# Patient Record
Sex: FEMALE | Race: WHITE | NOT HISPANIC OR LATINO | URBAN - METROPOLITAN AREA
[De-identification: names, ages, dates, MRNs, and addresses within clinical notes are randomized per-mention and may not be internally consistent; named-entity substitution may affect disease eponyms.]

---

## 2024-05-24 ENCOUNTER — OFFICE VISIT (OUTPATIENT)
Dept: GASTROENTEROLOGY | Facility: CLINIC | Age: 49
End: 2024-05-24
Payer: COMMERCIAL

## 2024-05-24 VITALS — WEIGHT: 224 LBS | OXYGEN SATURATION: 97 % | HEART RATE: 74 BPM | BODY MASS INDEX: 37.32 KG/M2 | HEIGHT: 65 IN

## 2024-05-24 DIAGNOSIS — K21.9 GASTROESOPHAGEAL REFLUX DISEASE WITHOUT ESOPHAGITIS: ICD-10-CM

## 2024-05-24 DIAGNOSIS — K51.30 ULCERATIVE RECTOSIGMOIDITIS WITHOUT COMPLICATION (MULTI): Primary | ICD-10-CM

## 2024-05-24 PROCEDURE — 99204 OFFICE O/P NEW MOD 45 MIN: CPT | Performed by: INTERNAL MEDICINE

## 2024-05-24 PROCEDURE — 1036F TOBACCO NON-USER: CPT | Performed by: INTERNAL MEDICINE

## 2024-05-24 RX ORDER — ONDANSETRON 8 MG/1
8 TABLET, ORALLY DISINTEGRATING ORAL 2 TIMES DAILY PRN
COMMUNITY
Start: 2023-11-25

## 2024-05-24 RX ORDER — TRAZODONE HYDROCHLORIDE 100 MG/1
1 TABLET ORAL NIGHTLY
COMMUNITY
Start: 2023-08-09

## 2024-05-24 RX ORDER — LANCETS
EACH MISCELLANEOUS
COMMUNITY
Start: 2024-02-02

## 2024-05-24 RX ORDER — NORETHINDRONE 0.35 MG/1
1 TABLET ORAL DAILY
COMMUNITY

## 2024-05-24 RX ORDER — GABAPENTIN 300 MG/1
CAPSULE ORAL
COMMUNITY

## 2024-05-24 RX ORDER — ALBUTEROL SULFATE 90 UG/1
2 AEROSOL, METERED RESPIRATORY (INHALATION) 4 TIMES DAILY PRN
COMMUNITY
Start: 2024-05-17

## 2024-05-24 RX ORDER — VENLAFAXINE HYDROCHLORIDE 150 MG/1
150 CAPSULE, EXTENDED RELEASE ORAL DAILY
COMMUNITY

## 2024-05-24 RX ORDER — OMEPRAZOLE 40 MG/1
40 CAPSULE, DELAYED RELEASE ORAL 2 TIMES DAILY
COMMUNITY
Start: 2024-03-01

## 2024-05-24 RX ORDER — BLOOD SUGAR DIAGNOSTIC
STRIP MISCELLANEOUS
COMMUNITY
Start: 2024-02-02

## 2024-05-24 RX ORDER — RIZATRIPTAN BENZOATE 10 MG/1
TABLET, ORALLY DISINTEGRATING ORAL
COMMUNITY

## 2024-05-24 RX ORDER — METOPROLOL SUCCINATE 100 MG/1
100 TABLET, EXTENDED RELEASE ORAL
COMMUNITY
Start: 2024-03-21

## 2024-05-24 RX ORDER — LEVOTHYROXINE SODIUM 175 UG/1
175 TABLET ORAL
COMMUNITY
Start: 2022-08-04

## 2024-05-24 ASSESSMENT — ENCOUNTER SYMPTOMS
CARDIOVASCULAR NEGATIVE: 1
CONSTITUTIONAL NEGATIVE: 1
RESPIRATORY NEGATIVE: 1

## 2024-05-24 NOTE — PROGRESS NOTES
Subjective     History of Present Illness:     49 yo with erosive gastropathy, ulcerative proctosigmoiditis diagnosed in 7/2023 here to establish care. Has been treated at [facility] by Dr. Cheung and NP Roma Paris.  She has been treated with prednisone, rowasa enemas and balsalazide, did not tolerate latter. She was last seen at [facility] in Feb at that time on rowasa enemas and 20mg prednisone to begin slow taper over 8 weeks. Plan was that if enemas do not maintain remission or intolerant then to discuss alternate therapy.     Dec 2022 rectal bleeding seen at [facility] told to have colonoscopy which was done in July 2023 listed below. Diagnosed with ulcerative rectosigmoiditis.  Started initially on enemas  x 2 weeks which led to severe diarrhea, took imodium which led to vomiting . Subsequently given balsalazide but ended up admitted 9/23  on balsalazide during that time because of vomiting , there for 1 week- given IV solumedol, 20 bms per day in hospital, liquid mucus , low abd pain, no significant bleeding at that time. Discharged on prednisone - slow taper over next 3-4 months.   Again tried suppository, enemas, balsalazide, and other 5-asa led to diarrhea , abd pain.  Last on medication in Feb- suppository which made her ill, abd cramping, vomiting, diarrhea.  Also stopped prednisone at that time.       Since that time Bms currently 2-3 day formed, non bloody.  If caffeine has bowel movement and abdominal pain. If half caffeine ok.  Avoiding red meat. No nausea.   Previous baseline  every 3-4 days.   Lost 25 pounds in hospital, has gained all back.  More anemic with recent labs last week, hgb 11 as below.  No iron supplement currently.  Has had heavy periods. Needs to see gynecology.  Takes OCP but had 2 periods this month  Continues on omeprazole 40 mg daily with good control, no breakthrough sx or dysphagia.         Surgeries:  Shoulder, wisdom teeth          CRP 2/5/24- <0.3  2/5/24- Hgb 11.2, WBC 8.6, nl  plt  9/14/2023- iron 38, TIBC 144, % sat 24.6, ferritin 130.5      CT a/p 9.10.23- mild/mod acute vs inflammatory colitis; small hiatal hernia, mild hepatic steatosis, small nonobstructing b/l renal stones.      EGD 7/28/23: (Dr Cheung, for pain, heartburn):   Erosive gastropathy with no bleeding and no stigmata of recent bleeding. Biopsied.   Small hiatal hernia, normal duodenum.   Path: neg of stomach and esopahgus.  Colonoscopy 7/28/23 (for rectal bleeding):   Normal TI, diverticulosis in sigmoid and ascending.  Moderately active (Pizano Score 2) proctosigmoid ulcerative colitis, new since the last examination.   Biopsied.   - use rowasa enemas daily  - repeat colon in 5 years  Path: chronic active colitis in sigmoid and rectum      Review of Systems  Review of Systems   Constitutional: Negative.    Respiratory: Negative.     Cardiovascular: Negative.    Genitourinary: Negative.        Past Medical History      Social History   no tob/etoh/illicits    Family History  No diagnosed IBD in family.    Allergies  Not on File    Medications  No current outpatient medications     Objective   There were no vitals taken for this visit.   Physical Exam  Vitals reviewed.   Constitutional:       General: She is awake.   Cardiovascular:      Rate and Rhythm: Normal rate and regular rhythm.   Pulmonary:      Effort: Pulmonary effort is normal.      Breath sounds: Normal breath sounds.   Abdominal:      General: Bowel sounds are normal.      Palpations: Abdomen is soft.      Tenderness: There is no abdominal tenderness.   Neurological:      Mental Status: She is alert and oriented to person, place, and time.   Psychiatric:         Attention and Perception: Attention and perception normal.         Behavior: Behavior normal.               Assessment/Plan     47 yo with erosive gastropathy, ulcerative proctosigmoiditis diagnosed in 7/2023 here to establish care.  Previously treated with prednisone , rowasa enemas and mesalamine  with significant  side effects to latter two.  Currently seems to be in clinical remission, however with assess with fecal calprotectin.  If persistent inflammation recommend starting entyvio.  She will need gynecology follow up for heavy menses causing anemia.  EGD and colonoscopy negative and no longer rectal bleeding to account for worsening anemia. Recommend starting iron supplement every other day.     Rec  Check fecal calprotectin  Check iron studies if not done by primary  Obain pcp labs including PPD ( indeterminate T spot at [facility])  Check crp  Avoid nsaids  Continue omeprazole 40 mg daily        Christiane Ramirez MD

## 2024-07-09 ENCOUNTER — LAB (OUTPATIENT)
Dept: LAB | Facility: LAB | Age: 49
End: 2024-07-09
Payer: COMMERCIAL

## 2024-07-09 DIAGNOSIS — K51.30 ULCERATIVE RECTOSIGMOIDITIS WITHOUT COMPLICATION (MULTI): ICD-10-CM

## 2024-07-09 LAB — CRP SERPL-MCNC: 0.19 MG/DL

## 2024-07-09 PROCEDURE — 86140 C-REACTIVE PROTEIN: CPT

## 2024-07-09 PROCEDURE — 86481 TB AG RESPONSE T-CELL SUSP: CPT

## 2024-07-09 PROCEDURE — 36415 COLL VENOUS BLD VENIPUNCTURE: CPT

## 2024-07-11 LAB
NIL(NEG) CONTROL SPOT COUNT: ABNORMAL
PANEL A SPOT COUNT: 1
PANEL B SPOT COUNT: 24
POS CONTROL SPOT COUNT: ABNORMAL
T-SPOT. TB INTERPRETATION: POSITIVE

## 2024-08-14 DIAGNOSIS — K51.30 ULCERATIVE RECTOSIGMOIDITIS WITHOUT COMPLICATION (MULTI): Primary | ICD-10-CM

## 2024-08-19 ENCOUNTER — LAB (OUTPATIENT)
Dept: LAB | Facility: LAB | Age: 49
End: 2024-08-19
Payer: COMMERCIAL

## 2024-08-19 DIAGNOSIS — K51.30 ULCERATIVE RECTOSIGMOIDITIS WITHOUT COMPLICATION (MULTI): ICD-10-CM

## 2024-08-19 PROCEDURE — 36415 COLL VENOUS BLD VENIPUNCTURE: CPT

## 2024-08-19 PROCEDURE — 86481 TB AG RESPONSE T-CELL SUSP: CPT

## 2024-08-19 PROCEDURE — 83993 ASSAY FOR CALPROTECTIN FECAL: CPT

## 2024-08-21 LAB
NIL(NEG) CONTROL SPOT COUNT: ABNORMAL
PANEL A SPOT COUNT: 3
PANEL B SPOT COUNT: 15
POS CONTROL SPOT COUNT: ABNORMAL
T-SPOT. TB INTERPRETATION: POSITIVE

## 2024-08-22 LAB — CALPROTECTIN STL-MCNT: 533 UG/G

## 2024-08-23 ENCOUNTER — APPOINTMENT (OUTPATIENT)
Dept: GASTROENTEROLOGY | Facility: CLINIC | Age: 49
End: 2024-08-23
Payer: COMMERCIAL

## 2024-08-23 VITALS — OXYGEN SATURATION: 97 % | WEIGHT: 228 LBS | BODY MASS INDEX: 37.99 KG/M2 | HEIGHT: 65 IN | HEART RATE: 83 BPM

## 2024-08-23 DIAGNOSIS — K51.30 ULCERATIVE RECTOSIGMOIDITIS WITHOUT COMPLICATION (MULTI): ICD-10-CM

## 2024-08-23 DIAGNOSIS — K21.9 GASTROESOPHAGEAL REFLUX DISEASE WITHOUT ESOPHAGITIS: Primary | ICD-10-CM

## 2024-08-23 DIAGNOSIS — R76.11 POSITIVE TB TEST: ICD-10-CM

## 2024-08-23 PROCEDURE — 99214 OFFICE O/P EST MOD 30 MIN: CPT | Performed by: INTERNAL MEDICINE

## 2024-08-23 PROCEDURE — 3008F BODY MASS INDEX DOCD: CPT | Performed by: INTERNAL MEDICINE

## 2024-08-23 PROCEDURE — 1036F TOBACCO NON-USER: CPT | Performed by: INTERNAL MEDICINE

## 2024-08-23 RX ORDER — OMEPRAZOLE 40 MG/1
40 CAPSULE, DELAYED RELEASE ORAL
Qty: 90 CAPSULE | Refills: 3 | Status: SHIPPED | OUTPATIENT
Start: 2024-08-23 | End: 2025-08-23

## 2024-08-23 NOTE — PROGRESS NOTES
Subjective     History of Present Illness:     49 yo with erosive gastropathy, ulcerative proctosigmoiditis diagnosed in 7/2023 here for follow up. Has been treated at [facility] by Dr. Cheung and NP Roma Paris.  She has been treated with prednisone, rowasa enemas and balsalazide, did not tolerate latter. She was last seen at [facility] in Feb at that time on rowasa enemas and 20mg prednisone to begin slow taper over 8 weeks. Plan was that if enemas do not maintain remission or intolerant then to discuss alternate therapy.     Last visit plan for fecal calprotectin , Tspot prior to entyvio if active disease. Tspot returned positive, prevoiusly with indeterminate result. Repeat 8/19 also positive.  Fecal calprotectin returned 8/19 533.  Reports bloating, occasional blood in stool 2 x per month. Feels fatigued, not great. Bms are once daily. + mucus in stool.  No unintentional weight loss.  Has not yet seen gynecology or endocrinology.    Gerd well controlled on omeprazole 40 mg daily, ran out this last week with recurrent heartburn. No dysphagia.       Previous history:  Dec 2022 rectal bleeding seen at [facility] told to have colonoscopy which was done in July 2023 listed below. Diagnosed with ulcerative rectosigmoiditis.  Started initially on enemas  x 2 weeks which led to severe diarrhea, took imodium which led to vomiting . Subsequently given balsalazide but ended up admitted 9/23  on balsalazide during that time because of vomiting , there for 1 week- given IV solumedol, 20 bms per day in hospital, liquid mucus , low abd pain, no significant bleeding at that time. Discharged on prednisone - slow taper over next 3-4 months.   Again tried suppository, enemas, balsalazide, and other 5-asa led to diarrhea , abd pain.  Last on medication in Feb- suppository which made her ill, abd cramping, vomiting, diarrhea.  Also stopped prednisone at that time.     CRP 2/5/24- <0.3  2/5/24- Hgb 11.2, WBC 8.6, nl plt  9/14/2023- iron 38, TIBC  144, % sat 24.6, ferritin 130.5    CT a/p 9.10.23- mild/mod acute vs inflammatory colitis; small hiatal hernia, mild hepatic steatosis, small nonobstructing b/l renal stones.    Colonoscopy 7/2023-normal TI, inflammation in rectum to sigmoid colon moderate. Diverticulosis in ascending and sigmoid.     Allergies  Allergies   Allergen Reactions    [place] Unknown     Severe abd pain, diarrhea    Aspartame Unknown     migraine    Balsalazide GI Upset and Nausea/vomiting    Egg GI Upset     diarrhea    Erythromycin GI Upset    Sucralose Unknown     migraine       Medications  Current Outpatient Medications   Medication Instructions    Accu-Chek Guide test strips strip TEST TWICE A DAY    Accu-Chek Softclix Lancets misc TEST TWICE A DAY    albuterol 90 mcg/actuation inhaler 2 puffs, inhalation, 4 times daily PRN    gabapentin (Neurontin) 300 mg capsule TAKE 1 CAPSULE BY MOUTH TWO TIMES A DAY FOR 90 DAYS.    levothyroxine (SYNTHROID, LEVOXYL) 175 mcg, oral, Daily RT    metoprolol succinate XL (TOPROL-XL) 100 mg, oral, Daily RT    norethindrone (Micronor) 0.35 mg tablet 1 tablet, oral, Daily    omeprazole (PRILOSEC) 40 mg, oral, 2 times daily    onabotulinumtoxinA (BOTOX) 200 Units, intradermal    ondansetron ODT (ZOFRAN-ODT) 8 mg, oral, 2 times daily PRN    rizatriptan MLT (Maxalt-MLT) 10 mg disintegrating tablet PLEASE SEE ATTACHED FOR DETAILED DIRECTIONS    traZODone (Desyrel) 100 mg tablet 1 tablet, oral, Nightly    venlafaxine XR (EFFEXOR-XR) 150 mg, oral, Daily        Objective   There were no vitals taken for this visit.   Physical Exam          Assessment/Plan:    49 yo with erosive gastropathy, ulcerative proctosigmoiditis diagnosed in 7/2023 here for follow up.  Positive T spot, intermittent rectal bleeding and positive fecal calprotectin c/w active UC.  Previously treated with prednisone , rowasa enemas and mesalamine with significant side effects to latter two. Recommend entyvio however will need ID consultation  for positive T spot. Pt without respiratory symptoms.      Rec  Referral to ID  Budesonide 9 mg for now   Plan entyvio once safe from ID standpoint  Avoid nsaids  Continue omeprazole 40 mg daily    Follow up 3 mo       Christiane Ramirez MD

## 2024-09-16 NOTE — PROGRESS NOTES
[facility] Infectious Diseases Consultation Note    Date of Consultation/Follow-up: 9/17/2024  Referred by: Dr. Ramirez    Reason For Consult: Positive T-spots    History Of Present Illness  Jesi Delcid is a 49 y.o. female with ulcerative proctosigmoiditis/colitis (diagnosed 7/2023, planned for Entyvio), erosive gastritis on omeprazole presenting for evalution of LTBI.    Today she reports she primarily has symptoms of cramping abdominal pain and blood in her stool. Denies fevers, chills, sweats (has some non-drenching sweats maybe every other month), cough, hemoptysis, predominant RLQ pain, joint pain/swelling, rash. She currently is not on budesonide due to approval issues it sounds     TB Rfs/Exposures  -Born: [place], lives in [place]  -Known TB contacts: none  -Prior tests: remote, negative at that time  -Prior treatment: Never tested  -Travel: [place] only (10th grade), no international travel  -Work: [employer], largely works from home  -Homeless shelters: no exposures/volunteer work  -Incarceration: no exposures/volunteer work  -No unpasteruized milk (oat milk), cheese, animal products bought directly from [employer]    Past Medical History  She has no past medical history on file.    Surgical History  She has no past surgical history on file.     Social History     Occupational History    Not on file   Tobacco Use    Smoking status: Never    Smokeless tobacco: Never   Substance and Sexual Activity    Alcohol use: Not Currently    Drug use: Never    Sexual activity: Not on file     Travel History   Travel since 08/17/24    No documented travel since 08/17/24            Family History  No family history on file.  Allergies  [place], Aspartame, Balsalazide, Egg, Erythromycin, and Sucralose     Immunization History   Administered Date(s) Administered    Pfizer Gray Cap SARS-CoV-2 05/02/2022    Pfizer Purple Cap SARS-CoV-2 03/31/2021, 04/24/2021     Medications  Current medications:  Scheduled  "medications    Continuous medications    PRN medications      Review of Systems     Objective  Range of Vitals (last 24 hours)      5/24/2024     2:22 PM 8/23/2024     1:28 PM 9/18/2024     2:04 PM   Vitals   Systolic   123   Diastolic   80   Heart Rate 74 83 81   Height (in) 1.651 m (5' 5\") 1.651 m (5' 5\") 1.651 m (5' 5\")   Weight (lb) 224 228 228   BMI 37.28 kg/m2 37.94 kg/m2 37.94 kg/m2   BSA (m2) 2.16 m2 2.17 m2 2.17 m2   Visit Report Report Report Report       Daily Weight  08/23/24 : 103 kg (228 lb)    There is no height or weight on file to calculate BMI.     Physical Exam  GENERAL APPEARANCE: Awake and alert and not in any distress  HEENT: Atraumatic and normocephalic  THROAT: No ulcers or thrush  NECK: Supple  CARDIAC: Regular, no murmur  LUNGS: Clear  ABDOMEN: Soft, mild TTP in bilateral L>R LQ, no rebound/guarding  MUSCULOSKELETAL: No swelling of major joints  EXTREMITIES: No edema  NEUROLOGICAL: Well oriented and answers appropriately  SKIN: No rash or ulcers, few scattered tattoos       Relevant Results    Labs              CrCl cannot be calculated (No successful lab value found.).  C-Reactive Protein   Date Value Ref Range Status   07/09/2024 0.19 <1.00 mg/dL Final       Microbiology  No results found for the last 90 days.  -08/19/24 T-spot: positive (3, 15 spots)  -07/09/24 T-spot: positive (1, 24)  -09/13/23 IGRA ([facility]): indeterminate    Assessment/Plan  Jesi Delcid is a 49 y.o. female with ulcerative proctosigmoiditis/colitis on budesonide (diagnosed 7/2023, on budesonide, planned for Entyvio), erosive gastritis on omeprazole presenting for evalution of LTBI.    Concern for LTBI in patient who is pending Entyvio initiation for IBD. Unclear source or timing of acquisition; no obvious RF but has two positive T-spots (highly specific tests) and she is planned for further immunosuppression, so would favor therapy. Her distribution of UC findings (continuous circumferential thickening from rectum to " sigmoid 7/2023) are inconsistent with active gastrointestinal TB disease. Would prefer to use rifampin x4 months, though it is listed as a category X interaction with omeprazole (may decrease omeprazole concentrations/efficacy; it seems she's had erosive gastritis that's fairly significant per her report). Accordingly will favor INH for 6-9 months.    Plan:  -CXR  -Monthly CBC with differential and CMPs, will obtain baseline today  -If CXR clear and labs ok, will plan INH/B6 therapy x6-9 months  -Reviewed side effects, including liver injury, neuropathy symptoms  -Reviewed LTBI testing (IGRA, PPD) will likely indefinitely remain positive despite appropriate treatment given it reflects immune response rather than direct TB testing  -ACR recommendations suggest waiting at least 4 weeks after LTBI is initiated prior to initiation of biologics  -Follow-up in 1 month    I spent 52 minutes in the care of this patient, including chart review, patient interview/exam, and documentation.    Jose De Jesus Munoz MD    ______________________________________________________  ADDENDUM 9/20:  Reviewed labs and CXR. Spoke with her, will plan for 6 months INH 300mg daily (5mg/kg with 300mg maximum daily dose) and pyridoxine 50mg daily. Will have her check labs in around 2 weeks time and monthly thereafter.

## 2024-09-18 ENCOUNTER — APPOINTMENT (OUTPATIENT)
Dept: INFECTIOUS DISEASES | Facility: CLINIC | Age: 49
End: 2024-09-18
Payer: COMMERCIAL

## 2024-09-18 ENCOUNTER — LAB (OUTPATIENT)
Dept: LAB | Facility: LAB | Age: 49
End: 2024-09-18
Payer: COMMERCIAL

## 2024-09-18 ENCOUNTER — HOSPITAL ENCOUNTER (OUTPATIENT)
Dept: RADIOLOGY | Facility: HOSPITAL | Age: 49
Discharge: HOME | End: 2024-09-18
Payer: COMMERCIAL

## 2024-09-18 VITALS
WEIGHT: 228 LBS | HEART RATE: 81 BPM | HEIGHT: 65 IN | SYSTOLIC BLOOD PRESSURE: 123 MMHG | DIASTOLIC BLOOD PRESSURE: 80 MMHG | BODY MASS INDEX: 37.99 KG/M2

## 2024-09-18 DIAGNOSIS — Z22.7 TB LUNG, LATENT: Primary | ICD-10-CM

## 2024-09-18 DIAGNOSIS — R76.11 POSITIVE TB TEST: ICD-10-CM

## 2024-09-18 DIAGNOSIS — K51.30 ULCERATIVE RECTOSIGMOIDITIS WITHOUT COMPLICATION (MULTI): ICD-10-CM

## 2024-09-18 LAB
ALBUMIN SERPL BCP-MCNC: 4.2 G/DL (ref 3.4–5)
ALP SERPL-CCNC: 81 U/L (ref 33–110)
ALT SERPL W P-5'-P-CCNC: 11 U/L (ref 7–45)
ANION GAP SERPL CALC-SCNC: 15 MMOL/L (ref 10–20)
AST SERPL W P-5'-P-CCNC: 15 U/L (ref 9–39)
BASOPHILS # BLD AUTO: 0.07 X10*3/UL (ref 0–0.1)
BASOPHILS NFR BLD AUTO: 0.9 %
BILIRUB SERPL-MCNC: 0.3 MG/DL (ref 0–1.2)
BUN SERPL-MCNC: 15 MG/DL (ref 6–23)
CALCIUM SERPL-MCNC: 9.4 MG/DL (ref 8.6–10.6)
CHLORIDE SERPL-SCNC: 98 MMOL/L (ref 98–107)
CO2 SERPL-SCNC: 30 MMOL/L (ref 21–32)
CREAT SERPL-MCNC: 0.8 MG/DL (ref 0.5–1.05)
EGFRCR SERPLBLD CKD-EPI 2021: >90 ML/MIN/1.73M*2
EOSINOPHIL # BLD AUTO: 0.21 X10*3/UL (ref 0–0.7)
EOSINOPHIL NFR BLD AUTO: 2.6 %
ERYTHROCYTE [DISTWIDTH] IN BLOOD BY AUTOMATED COUNT: 15.6 % (ref 11.5–14.5)
GLUCOSE SERPL-MCNC: 91 MG/DL (ref 74–99)
HCT VFR BLD AUTO: 36.5 % (ref 36–46)
HGB BLD-MCNC: 11.1 G/DL (ref 12–16)
IMM GRANULOCYTES # BLD AUTO: 0.02 X10*3/UL (ref 0–0.7)
IMM GRANULOCYTES NFR BLD AUTO: 0.2 % (ref 0–0.9)
LYMPHOCYTES # BLD AUTO: 2.18 X10*3/UL (ref 1.2–4.8)
LYMPHOCYTES NFR BLD AUTO: 26.6 %
MCH RBC QN AUTO: 24.8 PG (ref 26–34)
MCHC RBC AUTO-ENTMCNC: 30.4 G/DL (ref 32–36)
MCV RBC AUTO: 82 FL (ref 80–100)
MONOCYTES # BLD AUTO: 0.84 X10*3/UL (ref 0.1–1)
MONOCYTES NFR BLD AUTO: 10.2 %
NEUTROPHILS # BLD AUTO: 4.88 X10*3/UL (ref 1.2–7.7)
NEUTROPHILS NFR BLD AUTO: 59.5 %
NRBC BLD-RTO: 0 /100 WBCS (ref 0–0)
PLATELET # BLD AUTO: 411 X10*3/UL (ref 150–450)
POTASSIUM SERPL-SCNC: 4.7 MMOL/L (ref 3.5–5.3)
PROT SERPL-MCNC: 7 G/DL (ref 6.4–8.2)
RBC # BLD AUTO: 4.48 X10*6/UL (ref 4–5.2)
SODIUM SERPL-SCNC: 138 MMOL/L (ref 136–145)
WBC # BLD AUTO: 8.2 X10*3/UL (ref 4.4–11.3)

## 2024-09-18 PROCEDURE — 36415 COLL VENOUS BLD VENIPUNCTURE: CPT

## 2024-09-18 PROCEDURE — 1036F TOBACCO NON-USER: CPT | Performed by: STUDENT IN AN ORGANIZED HEALTH CARE EDUCATION/TRAINING PROGRAM

## 2024-09-18 PROCEDURE — 3008F BODY MASS INDEX DOCD: CPT | Performed by: STUDENT IN AN ORGANIZED HEALTH CARE EDUCATION/TRAINING PROGRAM

## 2024-09-18 PROCEDURE — 71046 X-RAY EXAM CHEST 2 VIEWS: CPT

## 2024-09-18 PROCEDURE — 99204 OFFICE O/P NEW MOD 45 MIN: CPT | Performed by: STUDENT IN AN ORGANIZED HEALTH CARE EDUCATION/TRAINING PROGRAM

## 2024-09-18 PROCEDURE — 80053 COMPREHEN METABOLIC PANEL: CPT

## 2024-09-18 PROCEDURE — 71046 X-RAY EXAM CHEST 2 VIEWS: CPT | Performed by: RADIOLOGY

## 2024-09-18 PROCEDURE — 85025 COMPLETE CBC W/AUTO DIFF WBC: CPT

## 2024-09-18 ASSESSMENT — PAIN SCALES - GENERAL: PAINLEVEL: 0-NO PAIN

## 2024-09-18 NOTE — LETTER
09/18/24    Christiane Ramirez MD  [address]  [facility]  [facility] [address]      Dear Dr. Christiane Ramirez MD,    Thank you for referring your patient, Jesi Delcid, to receive care in my office. I have enclosed a summary of the care provided to Jesi on 09/18/24.    Please contact me with any questions you may have regarding the visit.    Sincerely,         Jose De Jesus Munoz MD  [address]  [facility] [address]  [facility] [id number]    CC: No Recipients

## 2024-09-18 NOTE — PATIENT INSTRUCTIONS
Today we met regarding your TB testing. Your testing suggests that you have latent TB, which is non-infectious. In people undergoing immunosuppression, we typically treat for latent TB. I will review your prior testing in particular further, and we will have you obtain a chest x-ray to ensure no signs of active disease that warrant further investigation and for a baseline. I will call you with that result as well as your blood counts, liver numbers, and kidney function and to discuss isoniazid therapy.    Office number: [phone]

## 2024-09-20 RX ORDER — LANOLIN ALCOHOL/MO/W.PET/CERES
50 CREAM (GRAM) TOPICAL DAILY
Qty: 30 TABLET | Refills: 3 | Status: SHIPPED | OUTPATIENT
Start: 2024-09-20 | End: 2025-09-20

## 2024-09-20 RX ORDER — ISONIAZID 300 MG/1
300 TABLET ORAL DAILY
Qty: 30 TABLET | Refills: 3 | Status: SHIPPED | OUTPATIENT
Start: 2024-09-20 | End: 2025-01-18

## 2024-10-01 ENCOUNTER — APPOINTMENT (OUTPATIENT)
Dept: URGENT CARE | Age: 49
End: 2024-10-01
Payer: COMMERCIAL

## 2024-10-04 ENCOUNTER — LAB (OUTPATIENT)
Dept: LAB | Facility: LAB | Age: 49
End: 2024-10-04
Payer: COMMERCIAL

## 2024-10-04 DIAGNOSIS — Z22.7 TB LUNG, LATENT: ICD-10-CM

## 2024-10-04 DIAGNOSIS — R76.11 POSITIVE TB TEST: ICD-10-CM

## 2024-10-04 LAB
ALBUMIN SERPL BCP-MCNC: 4 G/DL (ref 3.4–5)
ALP SERPL-CCNC: 75 U/L (ref 33–110)
ALT SERPL W P-5'-P-CCNC: 13 U/L (ref 7–45)
ANION GAP SERPL CALCULATED.3IONS-SCNC: 11 MMOL/L (ref 10–20)
AST SERPL W P-5'-P-CCNC: 16 U/L (ref 9–39)
BASOPHILS # BLD AUTO: 0.05 X10*3/UL (ref 0–0.1)
BASOPHILS NFR BLD AUTO: 0.6 %
BILIRUB SERPL-MCNC: 0.4 MG/DL (ref 0–1.2)
BUN SERPL-MCNC: 14 MG/DL (ref 6–23)
CALCIUM SERPL-MCNC: 9 MG/DL (ref 8.6–10.3)
CHLORIDE SERPL-SCNC: 104 MMOL/L (ref 98–107)
CO2 SERPL-SCNC: 29 MMOL/L (ref 21–32)
CREAT SERPL-MCNC: 0.82 MG/DL (ref 0.5–1.05)
EGFRCR SERPLBLD CKD-EPI 2021: 88 ML/MIN/1.73M*2
EOSINOPHIL # BLD AUTO: 0.16 X10*3/UL (ref 0–0.7)
EOSINOPHIL NFR BLD AUTO: 2 %
ERYTHROCYTE [DISTWIDTH] IN BLOOD BY AUTOMATED COUNT: 15.7 % (ref 11.5–14.5)
GLUCOSE SERPL-MCNC: 93 MG/DL (ref 74–99)
HCT VFR BLD AUTO: 34.3 % (ref 36–46)
HGB BLD-MCNC: 10.8 G/DL (ref 12–16)
IMM GRANULOCYTES # BLD AUTO: 0.02 X10*3/UL (ref 0–0.7)
IMM GRANULOCYTES NFR BLD AUTO: 0.3 % (ref 0–0.9)
LYMPHOCYTES # BLD AUTO: 1.68 X10*3/UL (ref 1.2–4.8)
LYMPHOCYTES NFR BLD AUTO: 21.4 %
MCH RBC QN AUTO: 25.6 PG (ref 26–34)
MCHC RBC AUTO-ENTMCNC: 31.5 G/DL (ref 32–36)
MCV RBC AUTO: 81 FL (ref 80–100)
MONOCYTES # BLD AUTO: 0.89 X10*3/UL (ref 0.1–1)
MONOCYTES NFR BLD AUTO: 11.3 %
NEUTROPHILS # BLD AUTO: 5.05 X10*3/UL (ref 1.2–7.7)
NEUTROPHILS NFR BLD AUTO: 64.4 %
NRBC BLD-RTO: 0 /100 WBCS (ref 0–0)
PLATELET # BLD AUTO: 330 X10*3/UL (ref 150–450)
POTASSIUM SERPL-SCNC: 4.7 MMOL/L (ref 3.5–5.3)
PROT SERPL-MCNC: 6.5 G/DL (ref 6.4–8.2)
RBC # BLD AUTO: 4.22 X10*6/UL (ref 4–5.2)
SODIUM SERPL-SCNC: 139 MMOL/L (ref 136–145)
WBC # BLD AUTO: 7.9 X10*3/UL (ref 4.4–11.3)

## 2024-10-04 PROCEDURE — 80053 COMPREHEN METABOLIC PANEL: CPT

## 2024-10-04 PROCEDURE — 36415 COLL VENOUS BLD VENIPUNCTURE: CPT

## 2024-10-04 PROCEDURE — 85025 COMPLETE CBC W/AUTO DIFF WBC: CPT

## 2024-10-12 DIAGNOSIS — Z22.7 TB LUNG, LATENT: ICD-10-CM

## 2024-10-12 DIAGNOSIS — R76.11 POSITIVE TB TEST: ICD-10-CM

## 2024-10-14 RX ORDER — ISONIAZID 300 MG/1
300 TABLET ORAL DAILY
Qty: 90 TABLET | Refills: 2 | Status: SHIPPED | OUTPATIENT
Start: 2024-10-14

## 2024-10-21 NOTE — PROGRESS NOTES
[facility] Infectious Diseases Consultation Note    Date of Consultation/Follow-up: 10/21/2024  Referred by: Dr. Ramirez    Reason For Consult: Positive T-spots    History Of Present Illness  Jesi Delcid is a 49 y.o. female with ulcerative proctosigmoiditis/colitis (diagnosed 7/2023, planned for Entyvio), erosive gastritis on omeprazole presenting for evalution of LTBI.    We started INH/B6 last month 9/2024. She reports she is tolerating this well without GI upset or diarrhea. Her bowel movements have been about every other day these past few days without blood. Denies fevers, chills, sweats, cough, hemoptysis, rash, numbness or tingling, edema. She does note a L>R occipital headache (different from her migraines) the last few days that is reproducible and improves with icyhot/CBD oil temporarily.     TB Rfs/Exposures  -Born: [place], lives in [place]  -Known TB contacts: none  -Prior tests: remote, negative at that time  -Prior treatment: Never tested  -Travel: [employer] only (10th grade), no international travel  -Work: [employer], largely works from home  -Homeless shelters: no exposures/volunteer work  -Incarceration: no exposures/volunteer work  -No unpasteruized milk (oat milk), cheese, animal products bought directly from [employer]    Past Medical History  She has no past medical history on file.    Surgical History  She has no past surgical history on file.     Social History     Occupational History    Not on file   Tobacco Use    Smoking status: Never    Smokeless tobacco: Never   Substance and Sexual Activity    Alcohol use: Not Currently    Drug use: Never    Sexual activity: Not on file     Travel History   Travel since 09/21/24    No documented travel since 09/21/24            Family History  No family history on file.  Allergies  [place], Aspartame, Balsalazide, Egg, Erythromycin, and Sucralose     Immunization History   Administered Date(s) Administered    Pfizer Gray Cap SARS-CoV-2  "05/02/2022    Pfizer Purple Cap SARS-CoV-2 03/31/2021, 04/24/2021     Medications  Current medications:  Scheduled medications    Continuous medications    PRN medications      Review of Systems     Objective  Range of Vitals (last 24 hours)      5/24/2024     2:22 PM 8/23/2024     1:28 PM 9/18/2024     2:04 PM   Vitals   Systolic   123   Diastolic   80   Heart Rate 74 83 81   Height (in) 1.651 m (5' 5\") 1.651 m (5' 5\") 1.651 m (5' 5\")   Weight (lb) 224 228 228   BMI 37.28 kg/m2 37.94 kg/m2 37.94 kg/m2   BSA (m2) 2.16 m2 2.17 m2 2.17 m2   Visit Report Report Report Report       Daily Weight  09/18/24 : 103 kg (228 lb)    There is no height or weight on file to calculate BMI.     Physical Exam  GENERAL APPEARANCE: Awake and alert and not in any distress  HEENT: Atraumatic and normocephalic. L>R TTP along posterior neck musculature, reproducible. No meningismus. PERRL, EOMI. No OP  lesions  THROAT: No ulcers or thrush  NECK: Supple  CARDIAC: Regular, no murmur  LUNGS: Clear  MUSCULOSKELETAL: No swelling of major joints  EXTREMITIES: No edema  NEUROLOGICAL: Well oriented and answers appropriately  SKIN: No rash or ulcers, few scattered tattoos       Relevant Results    Labs              CrCl cannot be calculated (Patient's most recent lab result is older than the maximum 3 days allowed.).  C-Reactive Protein   Date Value Ref Range Status   07/09/2024 0.19 <1.00 mg/dL Final       Microbiology  No results found for the last 90 days.  -08/19/24 T-spot: positive (3, 15 spots)  -07/09/24 T-spot: positive (1, 24)  -09/13/23 IGRA ([facility]): indeterminate    Assessment/Plan  Jesi Delcid is a 49 y.o. female with ulcerative proctosigmoiditis/colitis on budesonide (diagnosed 7/2023, on budesonide, planned for Entyvio), erosive gastritis on omeprazole presenting for evalution of LTBI.    Concern for LTBI in patient who is pending Entyvio initiation for IBD. Unclear source or timing of acquisition; no obvious RF but has two positive " T-spots (highly specific tests) and she is planned for further immunosuppression, so would favor therapy. Her distribution of UC findings (continuous circumferential thickening from rectum to sigmoid 7/2023) are inconsistent with active gastrointestinal TB disease.     Started INH/B6 in 9/2024 due to category X interaction with omeprazole (may decrease omeprazole concentrations/efficacy; it seems she's had erosive gastritis that's fairly significant per her report).     Plan:  -Monthly CBC with differential and CMPs  -Continue INH/B6 x 6 months  -Reviewed side effects, including liver injury, neuropathy symptoms  -Previously reviewed LTBI testing (IGRA, PPD) will likely indefinitely remain positive despite appropriate treatment given it reflects immune response rather than direct TB testing  -ACR recommendations suggest waiting at least 4 weeks after LTBI is initiated prior to initiation of biologics  -Follow-up in 1 month    I spent 10 minutes in the care of this patient, including chart review, patient interview/exam, and documentation.    Jose De Jesus Munoz MD

## 2024-10-22 ENCOUNTER — APPOINTMENT (OUTPATIENT)
Dept: INFECTIOUS DISEASES | Facility: CLINIC | Age: 49
End: 2024-10-22
Payer: COMMERCIAL

## 2024-10-22 VITALS — SYSTOLIC BLOOD PRESSURE: 130 MMHG | HEART RATE: 76 BPM | DIASTOLIC BLOOD PRESSURE: 76 MMHG | TEMPERATURE: 97.5 F

## 2024-10-22 DIAGNOSIS — Z22.7 TB LUNG, LATENT: Primary | ICD-10-CM

## 2024-10-22 PROCEDURE — 1036F TOBACCO NON-USER: CPT | Performed by: STUDENT IN AN ORGANIZED HEALTH CARE EDUCATION/TRAINING PROGRAM

## 2024-10-22 PROCEDURE — 99212 OFFICE O/P EST SF 10 MIN: CPT | Performed by: STUDENT IN AN ORGANIZED HEALTH CARE EDUCATION/TRAINING PROGRAM

## 2024-11-05 ENCOUNTER — LAB (OUTPATIENT)
Dept: LAB | Facility: LAB | Age: 49
End: 2024-11-05
Payer: COMMERCIAL

## 2024-11-05 DIAGNOSIS — Z22.7 TB LUNG, LATENT: ICD-10-CM

## 2024-11-05 DIAGNOSIS — R76.11 POSITIVE TB TEST: ICD-10-CM

## 2024-11-05 LAB
ALBUMIN SERPL BCP-MCNC: 4 G/DL (ref 3.4–5)
ALP SERPL-CCNC: 75 U/L (ref 33–110)
ALT SERPL W P-5'-P-CCNC: 13 U/L (ref 7–45)
ANION GAP SERPL CALC-SCNC: 13 MMOL/L (ref 10–20)
AST SERPL W P-5'-P-CCNC: 16 U/L (ref 9–39)
BASOPHILS # BLD AUTO: 0.06 X10*3/UL (ref 0–0.1)
BASOPHILS NFR BLD AUTO: 0.8 %
BILIRUB SERPL-MCNC: 0.3 MG/DL (ref 0–1.2)
BUN SERPL-MCNC: 12 MG/DL (ref 6–23)
CALCIUM SERPL-MCNC: 8.7 MG/DL (ref 8.6–10.6)
CHLORIDE SERPL-SCNC: 100 MMOL/L (ref 98–107)
CO2 SERPL-SCNC: 29 MMOL/L (ref 21–32)
CREAT SERPL-MCNC: 0.7 MG/DL (ref 0.5–1.05)
EGFRCR SERPLBLD CKD-EPI 2021: >90 ML/MIN/1.73M*2
EOSINOPHIL # BLD AUTO: 0.22 X10*3/UL (ref 0–0.7)
EOSINOPHIL NFR BLD AUTO: 2.9 %
ERYTHROCYTE [DISTWIDTH] IN BLOOD BY AUTOMATED COUNT: 15.8 % (ref 11.5–14.5)
GLUCOSE SERPL-MCNC: 116 MG/DL (ref 74–99)
HCT VFR BLD AUTO: 35.1 % (ref 36–46)
HGB BLD-MCNC: 10.6 G/DL (ref 12–16)
IMM GRANULOCYTES # BLD AUTO: 0.02 X10*3/UL (ref 0–0.7)
IMM GRANULOCYTES NFR BLD AUTO: 0.3 % (ref 0–0.9)
LYMPHOCYTES # BLD AUTO: 2.08 X10*3/UL (ref 1.2–4.8)
LYMPHOCYTES NFR BLD AUTO: 27.4 %
MCH RBC QN AUTO: 25.1 PG (ref 26–34)
MCHC RBC AUTO-ENTMCNC: 30.2 G/DL (ref 32–36)
MCV RBC AUTO: 83 FL (ref 80–100)
MONOCYTES # BLD AUTO: 0.93 X10*3/UL (ref 0.1–1)
MONOCYTES NFR BLD AUTO: 12.3 %
NEUTROPHILS # BLD AUTO: 4.27 X10*3/UL (ref 1.2–7.7)
NEUTROPHILS NFR BLD AUTO: 56.3 %
NRBC BLD-RTO: 0 /100 WBCS (ref 0–0)
PLATELET # BLD AUTO: 363 X10*3/UL (ref 150–450)
POTASSIUM SERPL-SCNC: 4.3 MMOL/L (ref 3.5–5.3)
PROT SERPL-MCNC: 6.6 G/DL (ref 6.4–8.2)
RBC # BLD AUTO: 4.22 X10*6/UL (ref 4–5.2)
SODIUM SERPL-SCNC: 138 MMOL/L (ref 136–145)
WBC # BLD AUTO: 7.6 X10*3/UL (ref 4.4–11.3)

## 2024-11-05 PROCEDURE — 85025 COMPLETE CBC W/AUTO DIFF WBC: CPT

## 2024-11-05 PROCEDURE — 36415 COLL VENOUS BLD VENIPUNCTURE: CPT

## 2024-11-05 PROCEDURE — 80053 COMPREHEN METABOLIC PANEL: CPT

## 2024-11-13 ENCOUNTER — APPOINTMENT (OUTPATIENT)
Dept: GASTROENTEROLOGY | Facility: CLINIC | Age: 49
End: 2024-11-13
Payer: COMMERCIAL

## 2024-11-13 VITALS — BODY MASS INDEX: 39.49 KG/M2 | OXYGEN SATURATION: 95 % | HEIGHT: 65 IN | HEART RATE: 86 BPM | WEIGHT: 237 LBS

## 2024-11-13 DIAGNOSIS — E61.1 IRON DEFICIENCY: Primary | ICD-10-CM

## 2024-11-13 PROCEDURE — 3008F BODY MASS INDEX DOCD: CPT | Performed by: INTERNAL MEDICINE

## 2024-11-13 PROCEDURE — 99214 OFFICE O/P EST MOD 30 MIN: CPT | Performed by: INTERNAL MEDICINE

## 2024-11-13 NOTE — PROGRESS NOTES
Subjective     History of Present Illness:     48 yo with erosive gastropathy, ulcerative proctosigmoiditis diagnosed in 7/2023 here for follow up. Has been treated at [facility] by Dr. Cheung and [facility].  She has been treated with prednisone, rowasa enemas and balsalazide, did not tolerate latter. She was last seen at [facility] in Feb at that time on rowasa enemas and 20mg prednisone to begin slow taper over 8 weeks. Plan with those providers was that if enemas do not maintain remission or intolerant then to discuss alternate therapy.  Has not tolerated oral or rectal mesalamine, leads to abd pain, diarrhea, vomiting at one point hospital admission.    Last visit plan for fecal calprotectin , Tspot prior to entyvio if active disease. Tspot returned positive, prevoiusly with indeterminate result. Repeat 8/19 also positive.  Fecal calprotectin returned 8/19 533.  Referred to ID and recommended to treated latent TB x 6 mo then wait for biologics for 4 weeks after completion of therapy.   Currently doing fairly well. Minimal rectal bleeding once per month. No diarrhea. Occasional abdominal discomfort.  More constipated once per month, mild manageable.     Gerd well controlled on omeprazole 40 mg daily. No dysphagia.       Previous history:  Dec 2022 rectal bleeding seen at [facility] told to have colonoscopy which was done in July 2023 listed below. Diagnosed with ulcerative rectosigmoiditis.  Started initially on enemas  x 2 weeks which led to severe diarrhea, took imodium which led to vomiting . Subsequently given balsalazide but ended up admitted 9/23 on balsalazide during that time because of vomiting , there for 1 week- given IV solumedol, 20 bms per day in hospital, liquid mucus , low abd pain, no significant bleeding at that time. Discharged on prednisone - slow taper over next 3-4 months.   Again tried suppository, enemas, balsalazide, and other 5-asa led to diarrhea , abd pain.  Last on medication in Feb-  suppository which made her ill, abd cramping, vomiting, diarrhea.  Also stopped prednisone at that time.     CRP 2/5/24- <0.3  2/5/24- Hgb 11.2, WBC 8.6, nl plt  9/14/2023- iron 38, TIBC 144, % sat 24.6, ferritin 130.5    CT a/p 9.10.23- mild/mod acute vs inflammatory colitis; small hiatal hernia, mild hepatic steatosis, small nonobstructing b/l renal stones.    Colonoscopy 7/2023-normal TI, inflammation in rectum to sigmoid colon moderate. Diverticulosis in ascending and sigmoid.     Allergies  Allergies   Allergen Reactions    [place] Unknown     Severe abd pain, diarrhea    Aspartame Unknown     migraine    Balsalazide GI Upset and Nausea/vomiting    Egg GI Upset     diarrhea    Erythromycin GI Upset    Sucralose Unknown     migraine       Medications  Current Outpatient Medications   Medication Instructions    albuterol 90 mcg/actuation inhaler 2 puffs, 4 times daily PRN    budesonide 9 mg, oral, Daily    gabapentin (Neurontin) 300 mg capsule Take 1 capsule (300 mg) by mouth 3 times a day.    isoniazid (NYDRAZID) 300 mg, oral, Daily    levothyroxine (SYNTHROID, LEVOXYL) 175 mcg, Daily RT    metoprolol succinate XL (TOPROL-XL) 100 mg, Daily RT    norethindrone (Micronor) 0.35 mg tablet 1 tablet, Daily    omeprazole (PRILOSEC) 40 mg, oral, Daily before breakfast    onabotulinumtoxinA (BOTOX) 200 Units    ondansetron ODT (ZOFRAN-ODT) 8 mg, 2 times daily PRN    pyridoxine (VITAMIN B-6) 50 mg, oral, Daily    rizatriptan MLT (Maxalt-MLT) 10 mg disintegrating tablet PLEASE SEE ATTACHED FOR DETAILED DIRECTIONS    traZODone (Desyrel) 100 mg tablet 1 tablet, Nightly    venlafaxine XR (EFFEXOR-XR) 150 mg, Daily        Objective   There were no vitals taken for this visit.   Physical Exam  Vitals reviewed.   Constitutional:       General: She is awake.   Cardiovascular:      Rate and Rhythm: Normal rate and regular rhythm.   Pulmonary:      Effort: Pulmonary effort is normal.      Breath sounds: Normal breath sounds.    Abdominal:      General: Bowel sounds are normal.      Palpations: Abdomen is soft.      Tenderness: There is no abdominal tenderness.   Neurological:      Mental Status: She is alert and oriented to person, place, and time.   Psychiatric:         Attention and Perception: Attention and perception normal.         Behavior: Behavior normal.         Assessment/Plan:    48 yo with erosive gastropathy, ulcerative proctosigmoiditis diagnosed in 7/2023 here for follow up. Currently being treated with 6 mo INH/B6 for LTBI.  Cannot start biologics for before 4 weeks of completion of therapy. Adverse effects in past from oral and topical mesalamine. Has not tried sulfasalazine however worried about adverse effects. If needed, can try treating flare topically with hydrocortisone if develops.  Will follow up once closer to completion of LTBI therapy. Instructed to call if change in symptoms.       Christiane Ramirez MD

## 2024-11-29 ENCOUNTER — LAB (OUTPATIENT)
Dept: LAB | Facility: LAB | Age: 49
End: 2024-11-29
Payer: COMMERCIAL

## 2024-11-29 DIAGNOSIS — E61.1 IRON DEFICIENCY: ICD-10-CM

## 2024-11-29 LAB
FERRITIN SERPL-MCNC: 28 NG/ML (ref 8–150)
IRON SATN MFR SERPL: 12 % (ref 25–45)
IRON SERPL-MCNC: 49 UG/DL (ref 35–150)
TIBC SERPL-MCNC: 393 UG/DL (ref 240–445)
UIBC SERPL-MCNC: 344 UG/DL (ref 110–370)

## 2024-11-29 PROCEDURE — 82728 ASSAY OF FERRITIN: CPT

## 2024-11-29 PROCEDURE — 83550 IRON BINDING TEST: CPT

## 2024-11-29 PROCEDURE — 36415 COLL VENOUS BLD VENIPUNCTURE: CPT

## 2024-11-29 PROCEDURE — 83540 ASSAY OF IRON: CPT

## 2024-12-02 NOTE — PROGRESS NOTES
[facility] Infectious Diseases Consultation Note: Telephone Visit    Date of Consultation/Follow-up: 12/2/2024  Referred by: Dr. Ramirez    Reason For Consult: Positive T-spots, LTBI    History Of Present Illness  Jesi Delcid is a 49 y.o. female with ulcerative proctosigmoiditis/colitis (diagnosed 7/2023, planned for Entyvio), erosive gastritis on omeprazole presenting for treatment of LTBI. We started INH/B6 9/2024.     She reports she is tolerating this well without GI upset attributable to the INH/B6, though she feels that her UC symptoms are starting to flare. Denies fevers, chills, sweats, cough, hemoptysis, rash, numbness or tingling, edema.     TB Rfs/Exposures  -Born: [place], lives in [place]  -Known TB contacts: none  -Prior tests: remote, negative at that time  -Prior treatment: Never tested  -Travel: [place] only (10th grade), no international travel  -Work: [employer], largely works from home  -Homeless shelters: no exposures/volunteer work  -Incarceration: no exposures/volunteer work  -No unpasteruized milk (oat milk), cheese, animal products bought directly from [email or web address]    Past Medical History  She has no past medical history on file.    Surgical History  She has no past surgical history on file.     Social History     Occupational History    Not on file   Tobacco Use    Smoking status: Never    Smokeless tobacco: Never   Substance and Sexual Activity    Alcohol use: Not Currently    Drug use: Never    Sexual activity: Not on file     Travel History   Travel since 11/02/24    No documented travel since 11/02/24            Family History  No family history on file.  Allergies  [place], Aspartame, Balsalazide, Egg, Erythromycin, and Sucralose     Immunization History   Administered Date(s) Administered    Pfizer Gray Cap SARS-CoV-2 05/02/2022    Pfizer Purple Cap SARS-CoV-2 03/31/2021, 04/24/2021     Medications  Current medications:  Scheduled medications    Continuous medications    PRN  medications      Review of Systems     Objective: Telephone Visit  Relevant Results  Labs  Reviewed in Epic.    Microbiology  No results found for the last 90 days.  -08/19/24 T-spot: positive (3, 15 spots)  -07/09/24 T-spot: positive (1, 24 spots)  -09/13/23 IGRA ([facility]): indeterminate    Assessment/Plan  Jesi Delcid is a 49 y.o. female with ulcerative proctosigmoiditis/colitis on budesonide (diagnosed 7/2023, on budesonide, planned for Entyvio), erosive gastritis on omeprazole presenting for evalution of LTBI.    Concern for LTBI in patient who is pending Entyvio initiation for IBD. Unclear source or timing of acquisition; no obvious RF but has two positive T-spots (highly specific tests) and she is planned for further immunosuppression, so we favored Her distribution of UC findings (continuous circumferential thickening from rectum to sigmoid 7/2023) are inconsistent with active gastrointestinal TB disease.     Started INH/B6 in 9/2024 due to category X interaction of rifampin with omeprazole (may decrease omeprazole concentrations/efficacy; it seems she's had erosive gastritis that's fairly significant per her report).     Plan:  -Monthly CBC with differential and CMPs  -Continue INH/B6 x 6 months (started 9/2024)  -2015 ACR recommendations suggest waiting at least 4 weeks after LTBI is initiated (not completed) prior to initiation of biologics/immunosuppression. She would be ok to start UC therapy at this point, especially if her symptoms are progressing; will copy our GI colleagues in case this is helpful  -Previously reviewed LTBI testing (IGRA, PPD) will likely indefinitely remain positive despite appropriate treatment given it reflects immune response rather than direct TB testing  -Follow-up in 2 months, she will contact in the interim if developing side effects    I spent 17 minutes in the care of this patient, including chart review, patient interview/exam, and documentation.    Jose De Jesus Munoz MD

## 2024-12-03 ENCOUNTER — APPOINTMENT (OUTPATIENT)
Dept: INFECTIOUS DISEASES | Facility: CLINIC | Age: 49
End: 2024-12-03
Payer: COMMERCIAL

## 2024-12-03 DIAGNOSIS — R76.11 POSITIVE TB TEST: ICD-10-CM

## 2024-12-03 DIAGNOSIS — Z22.7 TB LUNG, LATENT: ICD-10-CM

## 2024-12-03 PROBLEM — K51.314: Status: ACTIVE | Noted: 2024-12-03

## 2024-12-03 PROCEDURE — 99442 PR PHYS/QHP TELEPHONE EVALUATION 11-20 MIN: CPT | Performed by: STUDENT IN AN ORGANIZED HEALTH CARE EDUCATION/TRAINING PROGRAM

## 2024-12-03 RX ORDER — ISONIAZID 300 MG/1
300 TABLET ORAL DAILY
Qty: 90 TABLET | Refills: 2 | Status: SHIPPED | OUTPATIENT
Start: 2024-12-03

## 2024-12-03 RX ORDER — LANOLIN ALCOHOL/MO/W.PET/CERES
50 CREAM (GRAM) TOPICAL DAILY
Qty: 30 TABLET | Refills: 3 | Status: SHIPPED | OUTPATIENT
Start: 2024-12-03 | End: 2025-03-03

## 2024-12-03 NOTE — LETTER
December 3, 2024     Christiane Ramirez MD  [address]  [facility], [address]  [facility] [address]    Patient: Jesi Delcid   YOB: 1975   Date of Visit: 12/3/2024       Dear Dr. Christiane Ramirez MD:    Thank you for referring Jesi Delcid to me for evaluation. Below are my notes for this consultation.  If you have questions, please do not hesitate to call me. I look forward to following your patient along with you.       Sincerely,     Jose De Jesus Munoz MD      CC: No Recipients  ______________________________________________________________________________________    [facility] Infectious Diseases Consultation Note: Telephone Visit    Date of Consultation/Follow-up: 12/2/2024  Referred by: Dr. Ramirez    Reason For Consult: Positive T-spots, LTBI    History Of Present Illness  Jesi Delcid is a 49 y.o. female with ulcerative proctosigmoiditis/colitis (diagnosed 7/2023, planned for Entyvio), erosive gastritis on omeprazole presenting for treatment of LTBI. We started INH/B6 9/2024.     She reports she is tolerating this well without GI upset attributable to the INH/B6, though she feels that her UC symptoms are starting to flare. Denies fevers, chills, sweats, cough, hemoptysis, rash, numbness or tingling, edema.     TB Rfs/Exposures  -Born: [place], lives in [place]  -Known TB contacts: none  -Prior tests: remote, negative at that time  -Prior treatment: Never tested  -Travel: [employer] only (10th grade), no international travel  -Work: [employer], largely works from home  -Homeless shelters: no exposures/volunteer work  -Incarceration: no exposures/volunteer work  -No unpasteruized milk (oat milk), cheese, animal products bought directly from farms    Past Medical History  She has no past medical history on file.    Surgical History  She has no past surgical history on file.     Social History     Occupational History   • Not on file   Tobacco Use   • Smoking status:  Never   • Smokeless tobacco: Never   Substance and Sexual Activity   • Alcohol use: Not Currently   • Drug use: Never   • Sexual activity: Not on file     Travel History   Travel since 11/02/24    No documented travel since 11/02/24            Family History  No family history on file.  Allergies  [place], Aspartame, Balsalazide, Egg, Erythromycin, and Sucralose     Immunization History   Administered Date(s) Administered   • Pfizer Gray Cap SARS-CoV-2 05/02/2022   • Pfizer Purple Cap SARS-CoV-2 03/31/2021, 04/24/2021     Medications  Current medications:  Scheduled medications    Continuous medications    PRN medications      Review of Systems     Objective: Telephone Visit  Relevant Results  Labs  Reviewed in Epic.    Microbiology  No results found for the last 90 days.  -08/19/24 T-spot: positive (3, 15 spots)  -07/09/24 T-spot: positive (1, 24 spots)  -09/13/23 IGRA ([facility]): indeterminate    Assessment/Plan  Jesi Delcid is a 49 y.o. female with ulcerative proctosigmoiditis/colitis on budesonide (diagnosed 7/2023, on budesonide, planned for Entyvio), erosive gastritis on omeprazole presenting for evalution of LTBI.    Concern for LTBI in patient who is pending Entyvio initiation for IBD. Unclear source or timing of acquisition; no obvious RF but has two positive T-spots (highly specific tests) and she is planned for further immunosuppression, so we favored Her distribution of UC findings (continuous circumferential thickening from rectum to sigmoid 7/2023) are inconsistent with active gastrointestinal TB disease.     Started INH/B6 in 9/2024 due to category X interaction of rifampin with omeprazole (may decrease omeprazole concentrations/efficacy; it seems she's had erosive gastritis that's fairly significant per her report).     Plan:  -Monthly CBC with differential and CMPs  -Continue INH/B6 x 6 months (started 9/2024)  -2015 ACR recommendations suggest waiting at least 4 weeks after LTBI is initiated (not  completed) prior to initiation of biologics/immunosuppression. She would be ok to start UC therapy at this point, especially if her symptoms are progressing; will copy our GI colleagues in case this is helpful  -Previously reviewed LTBI testing (IGRA, PPD) will likely indefinitely remain positive despite appropriate treatment given it reflects immune response rather than direct TB testing  -Follow-up in 2 months, she will contact in the interim if developing side effects    I spent 17 minutes in the care of this patient, including chart review, patient interview/exam, and documentation.    Jose De Jesus Munoz MD

## 2024-12-26 ENCOUNTER — OFFICE VISIT (OUTPATIENT)
Dept: URGENT CARE | Age: 49
End: 2024-12-26
Payer: COMMERCIAL

## 2024-12-26 VITALS
DIASTOLIC BLOOD PRESSURE: 70 MMHG | SYSTOLIC BLOOD PRESSURE: 131 MMHG | RESPIRATION RATE: 18 BRPM | OXYGEN SATURATION: 97 % | WEIGHT: 235 LBS | BODY MASS INDEX: 41.64 KG/M2 | HEIGHT: 63 IN | TEMPERATURE: 97.6 F | HEART RATE: 78 BPM

## 2024-12-26 DIAGNOSIS — R05.1 ACUTE COUGH: Primary | ICD-10-CM

## 2024-12-26 DIAGNOSIS — R68.89 FLU-LIKE SYMPTOMS: ICD-10-CM

## 2024-12-26 DIAGNOSIS — J40 BRONCHITIS: ICD-10-CM

## 2024-12-26 DIAGNOSIS — R06.2 WHEEZES: ICD-10-CM

## 2024-12-26 LAB
POC RAPID INFLUENZA A: NEGATIVE
POC RAPID INFLUENZA B: NEGATIVE
POC SARS-COV-2 AG BINAX: NORMAL

## 2024-12-26 RX ORDER — IPRATROPIUM BROMIDE AND ALBUTEROL SULFATE 2.5; .5 MG/3ML; MG/3ML
3 SOLUTION RESPIRATORY (INHALATION) ONCE
Status: COMPLETED | OUTPATIENT
Start: 2024-12-26 | End: 2024-12-26

## 2024-12-26 RX ORDER — PREDNISONE 20 MG/1
20 TABLET ORAL 2 TIMES DAILY
Qty: 10 TABLET | Refills: 0 | Status: SHIPPED | OUTPATIENT
Start: 2024-12-26 | End: 2024-12-31

## 2024-12-26 RX ORDER — ALBUTEROL SULFATE 90 UG/1
2 INHALANT RESPIRATORY (INHALATION) EVERY 4 HOURS PRN
Qty: 8.5 G | Refills: 0 | Status: SHIPPED | OUTPATIENT
Start: 2024-12-26 | End: 2025-12-26

## 2024-12-26 RX ORDER — DOXYCYCLINE HYCLATE 100 MG
100 TABLET ORAL 2 TIMES DAILY
Qty: 20 TABLET | Refills: 0 | Status: SHIPPED | OUTPATIENT
Start: 2024-12-26 | End: 2025-01-05

## 2024-12-26 ASSESSMENT — ENCOUNTER SYMPTOMS
CARDIOVASCULAR NEGATIVE: 1
WHEEZING: 1
RHINORRHEA: 1
COUGH: 1
EYES NEGATIVE: 1
MUSCULOSKELETAL NEGATIVE: 1
ALLERGIC/IMMUNOLOGIC NEGATIVE: 1
NEUROLOGICAL NEGATIVE: 1
SINUS PAIN: 1
PSYCHIATRIC NEGATIVE: 1
HEMATOLOGIC/LYMPHATIC NEGATIVE: 1
GASTROINTESTINAL NEGATIVE: 1
CHILLS: 1
ENDOCRINE NEGATIVE: 1

## 2024-12-27 NOTE — PROGRESS NOTES
Subjective   Patient ID: Jesi Delcid is a 49 y.o. female. They present today with a chief complaint of Nasal Congestion and sob no voice (x6day).    History of Present Illness    Cough  This is a new problem. The current episode started in the past 7 days. The problem has been gradually worsening. The cough is Productive of brown sputum. Associated symptoms include chills, nasal congestion, postnasal drip, rhinorrhea and wheezing. The symptoms are aggravated by lying down. She has tried a beta-agonist inhaler for the symptoms. Her past medical history is significant for asthma.       Past Medical History  Allergies as of 12/26/2024 - Reviewed 12/26/2024   Allergen Reaction Noted    [place] Unknown 09/10/2023    Aspartame Unknown 11/02/2017    Balsalazide GI Upset and Nausea/vomiting 08/28/2023    Egg GI Upset 01/07/2014    Erythromycin GI Upset 05/20/2015    Sucralose Unknown 11/02/2017       (Not in a hospital admission)       History reviewed. No pertinent past medical history.    History reviewed. No pertinent surgical history.     reports that she has never smoked. She has never used smokeless tobacco. She reports that she does not currently use alcohol. She reports that she does not use drugs.    Review of Systems  Review of Systems   Constitutional:  Positive for chills.   HENT:  Positive for postnasal drip, rhinorrhea and sinus pain.    Eyes: Negative.    Respiratory:  Positive for cough and wheezing.    Cardiovascular: Negative.    Gastrointestinal: Negative.    Endocrine: Negative.    Genitourinary: Negative.    Musculoskeletal: Negative.    Skin: Negative.    Allergic/Immunologic: Negative.    Neurological: Negative.    Hematological: Negative.    Psychiatric/Behavioral: Negative.                                    Objective    Vitals:    12/26/24 1835   BP: 131/70   BP Location: Left arm   Patient Position: Sitting   BP Cuff Size: Adult   Pulse: 78   Resp: 18   Temp: 36.4 °C (97.6 °F)   TempSrc: Oral  "  SpO2: 97%   Weight: 107 kg (235 lb)   Height: 1.6 m (5' 3\")     Patient's last menstrual period was 12/24/2024.    Physical Exam  Vitals and nursing note reviewed.   Constitutional:       Appearance: Normal appearance. She is normal weight.   HENT:      Head: Normocephalic.      Right Ear: Tympanic membrane is erythematous and bulging.      Left Ear: Tympanic membrane is erythematous and bulging.      Nose: Nasal tenderness, mucosal edema, congestion and rhinorrhea present. Rhinorrhea is purulent.      Right Sinus: Maxillary sinus tenderness present.      Left Sinus: Maxillary sinus tenderness present.   Cardiovascular:      Rate and Rhythm: Normal rate and regular rhythm.      Pulses: Normal pulses.      Heart sounds: Normal heart sounds.   Pulmonary:      Breath sounds: Wheezing present.   Abdominal:      General: Bowel sounds are normal.      Palpations: Abdomen is soft.   Skin:     General: Skin is warm and dry.   Neurological:      General: No focal deficit present.      Mental Status: She is alert and oriented to person, place, and time. Mental status is at baseline.   Psychiatric:         Mood and Affect: Mood normal.         Behavior: Behavior normal.         Thought Content: Thought content normal.         Judgment: Judgment normal.         Procedures    Point of Care Test & Imaging Results from this visit  Results for orders placed or performed in visit on 12/26/24   POCT Influenza A/B manually resulted   Result Value Ref Range    POC Rapid Influenza A Negative Negative    POC Rapid Influenza B Negative Negative   POCT Covid-19 Rapid Antigen   Result Value Ref Range    POC KATIA-COV-2 AG  Presumptive negative test for SARS-CoV-2 (no antigen detected)     Presumptive negative test for SARS-CoV-2 (no antigen detected)      No results found.    Diagnostic study results (if any) were reviewed by KELVIN De La O-VITA.    Assessment/Plan   Allergies, medications, history, and pertinent labs/EKGs/Imaging " reviewed by Kim Rodrigues, KELVIN-CNP.     Medical Decision Making  Medical Decision Making  At time of discharge patient was clinically well-appearing and HDS for outpatient management. The patient and/or family was educated regarding diagnosis, supportive care, OTC and Rx medications. The patient and/or family was given the opportunity to ask questions prior to discharge.  They verbalized understanding of my discussion of the plans for treatment, expected course, indications to return to  or seek further evaluation in ED, and the need for timely follow up as directed.   They were provided with a work/school excuse if requested.        Orders and Diagnoses  Diagnoses and all orders for this visit:  Acute cough  -     doxycycline (Vibra-Tabs) 100 mg tablet; Take 1 tablet (100 mg) by mouth 2 times a day for 10 days. Take with a full glass of water and do not lie down for at least 30 minutes after.  -     ipratropium-albuteroL (Duo-Neb) 0.5-2.5 mg/3 mL nebulizer solution 3 mL  -     albuterol (ProAir HFA) 90 mcg/actuation inhaler; Inhale 2 puffs every 4 hours if needed for wheezing or shortness of breath.  -     predniSONE (Deltasone) 20 mg tablet; Take 1 tablet (20 mg) by mouth 2 times a day for 5 days.  Flu-like symptoms  -     POCT Influenza A/B manually resulted  -     POCT Covid-19 Rapid Antigen  -     doxycycline (Vibra-Tabs) 100 mg tablet; Take 1 tablet (100 mg) by mouth 2 times a day for 10 days. Take with a full glass of water and do not lie down for at least 30 minutes after.  -     ipratropium-albuteroL (Duo-Neb) 0.5-2.5 mg/3 mL nebulizer solution 3 mL  -     albuterol (ProAir HFA) 90 mcg/actuation inhaler; Inhale 2 puffs every 4 hours if needed for wheezing or shortness of breath.  -     predniSONE (Deltasone) 20 mg tablet; Take 1 tablet (20 mg) by mouth 2 times a day for 5 days.  Wheezes  -     doxycycline (Vibra-Tabs) 100 mg tablet; Take 1 tablet (100 mg) by mouth 2 times a day for 10 days. Take with  a full glass of water and do not lie down for at least 30 minutes after.  -     ipratropium-albuteroL (Duo-Neb) 0.5-2.5 mg/3 mL nebulizer solution 3 mL  -     albuterol (ProAir HFA) 90 mcg/actuation inhaler; Inhale 2 puffs every 4 hours if needed for wheezing or shortness of breath.  -     predniSONE (Deltasone) 20 mg tablet; Take 1 tablet (20 mg) by mouth 2 times a day for 5 days.  Bronchitis  -     doxycycline (Vibra-Tabs) 100 mg tablet; Take 1 tablet (100 mg) by mouth 2 times a day for 10 days. Take with a full glass of water and do not lie down for at least 30 minutes after.  -     ipratropium-albuteroL (Duo-Neb) 0.5-2.5 mg/3 mL nebulizer solution 3 mL  -     albuterol (ProAir HFA) 90 mcg/actuation inhaler; Inhale 2 puffs every 4 hours if needed for wheezing or shortness of breath.  -     predniSONE (Deltasone) 20 mg tablet; Take 1 tablet (20 mg) by mouth 2 times a day for 5 days.      Medical Admin Record  Administrations This Visit       ipratropium-albuteroL (Duo-Neb) 0.5-2.5 mg/3 mL nebulizer solution 3 mL       Admin Date  12/26/2024 Action  Given Dose  3 mL Route  nebulization Documented By  Milka Vincent MA                  Return to Urgent care if symptoms return or progress  Follow up with PCP in 1-2 weeks   Patient disposition: Home    Electronically signed by MANDIE De La O  7:18 PM

## 2025-02-03 NOTE — PROGRESS NOTES
[facility] Infectious Diseases Consultation Note: Follow-up    Date of Consultation/Follow-up: 2/3/2025  Referred by: Dr. Ramirez     Reason For Consult: Positive T-spots, LTBI    History Of Present Illness  Jesi Delcid is a 49 y.o. female with ulcerative proctosigmoiditis/colitis (diagnosed 7/2023, planned for Entyvio), erosive gastritis on omeprazole presenting for treatment of LTBI. We started INH/B6 9/2024.      She reports she is tolerating this well without GI upset attributable to the INH/B6; her UC symptoms have been better controlled since starting therapy around Christmas 2024. She had a viral illness around then as well that resolved except for a lingering intermittent cough without hemoptysis, fevers, chills, or weight loss. No numbness, tingling.     TB Rfs/Exposures  -Born: [place], lives in [place]  -Known TB contacts: none  -Prior tests: remote, negative at that time  -Prior treatment: Never tested  -Travel: [place] only (10th grade), no international travel  -Work: [employer], largely works from home  -Homeless shelters: no exposures/volunteer work  -Incarceration: no exposures/volunteer work  -No unpasteruized milk (oat milk), cheese, animal products bought directly from [facility]     Past Medical History  She has no past medical history on file.    Surgical History  She has no past surgical history on file.     Social History     Occupational History    Not on file   Tobacco Use    Smoking status: Never    Smokeless tobacco: Never   Substance and Sexual Activity    Alcohol use: Not Currently    Drug use: Never    Sexual activity: Not on file     Travel History   Travel since 01/03/25    No documented travel since 01/03/25            Family History  No family history on file.  Allergies  [place], Aspartame, Balsalazide, Egg, Erythromycin, and Sucralose     Immunization History   Administered Date(s) Administered    COVID-19, mRNA, LNP-S, PF, 30 mcg/0.3 mL dose 03/31/2021, 04/24/2021     Pfizer Gray Cap SARS-CoV-2 05/02/2022     Medications  Current Outpatient Medications on File Prior to Visit   Medication Sig Dispense Refill    albuterol (ProAir HFA) 90 mcg/actuation inhaler Inhale 2 puffs every 4 hours if needed for wheezing or shortness of breath. 8.5 g 0    albuterol 90 mcg/actuation inhaler Inhale 2 puffs 4 times a day as needed.      gabapentin (Neurontin) 300 mg capsule Take 1 capsule (300 mg) by mouth 3 times a day.      isoniazid (Nydrazid) 300 mg tablet Take 1 tablet (300 mg) by mouth once daily. 90 tablet 2    levothyroxine (Synthroid, Levoxyl) 175 mcg tablet Take 1 tablet (175 mcg) by mouth once daily.      metoprolol succinate XL (Toprol-XL) 100 mg 24 hr tablet Take 1 tablet (100 mg) by mouth once daily.      norethindrone (Micronor) 0.35 mg tablet Take 1 tablet (0.35 mg) by mouth once daily.      omeprazole (PriLOSEC) 40 mg DR capsule Take 1 capsule (40 mg) by mouth once daily in the morning. Take before meals. 90 capsule 3    onabotulinumtoxinA (Botox) 200 unit injection Inject 200 Units into the skin.      pyridoxine (Vitamin B-6) 50 mg tablet Take 1 tablet (50 mg) by mouth once daily. 30 tablet 3    rizatriptan MLT (Maxalt-MLT) 10 mg disintegrating tablet PLEASE SEE ATTACHED FOR DETAILED DIRECTIONS      traZODone (Desyrel) 100 mg tablet Take 1 tablet (100 mg) by mouth once daily at bedtime.      venlafaxine XR (Effexor-XR) 150 mg 24 hr capsule Take 1 capsule (150 mg) by mouth once daily.       No current facility-administered medications on file prior to visit.         Review of Systems     Objective: Virtual Visit    Relevant Results    Labs              CrCl cannot be calculated (Patient's most recent lab result is older than the maximum 3 days allowed.).  C-Reactive Protein   Date Value Ref Range Status   07/09/2024 0.19 <1.00 mg/dL Final       Microbiology  No results found for the last 90 days.  -08/19/24 T-spot: positive (3, 15 spots)  -07/09/24 T-spot: positive (1, 24  spots)  -09/13/23 IGRA (CC): indeterminate     Assessment/Plan  Jesi Delcid is a 49 y.o. female with ulcerative proctosigmoiditis/colitis on budesonide (diagnosed 7/2023, on budesonide, planned for Entyvio), erosive gastritis on omeprazole presenting for evalution of LTBI.     Concern for LTBI in patient who is pending Entyvio initiation for IBD. Unclear source or timing of acquisition; no obvious RF but has two positive T-spots and she is planned for further immunosuppression, so we favored treatment. Her distribution of UC findings (continuous circumferential thickening from rectum to sigmoid 7/2023) are inconsistent with active gastrointestinal TB disease.      Started INH/B6 in 9/2024 due to category X interaction of rifampin with omeprazole (may decrease omeprazole concentrations/efficacy; it seems she's had erosive gastritis that's fairly significant per her report).      Plan:  -Monthly CBC with differential and CMPs - she will message me with these results which she had collected via home care  -Continue INH/B6 x 6 months (started 9/2024) to complete 6 months of therapy in 3/2025  -Previously reviewed LTBI testing (IGRA, PPD) will likely indefinitely remain positive despite appropriate treatment given it reflects immune response rather than direct TB testing  -Follow-up PRN as long as labs look ok    I spent 21 minutes in the care of this patient, including chart review, patient interview/exam, and documentation.    Jose De Jesus Munoz MD

## 2025-02-04 ENCOUNTER — APPOINTMENT (OUTPATIENT)
Dept: INFECTIOUS DISEASES | Facility: CLINIC | Age: 50
End: 2025-02-04
Payer: COMMERCIAL

## 2025-02-04 DIAGNOSIS — Z22.7 TB LUNG, LATENT: ICD-10-CM

## 2025-02-04 DIAGNOSIS — R76.11 POSITIVE TB TEST: ICD-10-CM

## 2025-02-04 PROCEDURE — 99213 OFFICE O/P EST LOW 20 MIN: CPT | Performed by: STUDENT IN AN ORGANIZED HEALTH CARE EDUCATION/TRAINING PROGRAM

## 2025-02-04 RX ORDER — LANOLIN ALCOHOL/MO/W.PET/CERES
50 CREAM (GRAM) TOPICAL DAILY
Qty: 30 TABLET | Refills: 0 | Status: SHIPPED | OUTPATIENT
Start: 2025-03-03 | End: 2025-04-02

## 2025-02-04 RX ORDER — ISONIAZID 300 MG/1
300 TABLET ORAL DAILY
Qty: 17 TABLET | Refills: 0 | Status: SHIPPED | OUTPATIENT
Start: 2025-03-03 | End: 2025-03-20

## 2025-05-13 ENCOUNTER — APPOINTMENT (OUTPATIENT)
Dept: GASTROENTEROLOGY | Facility: CLINIC | Age: 50
End: 2025-05-13
Payer: COMMERCIAL

## 2025-05-13 VITALS — HEART RATE: 88 BPM | OXYGEN SATURATION: 96 % | HEIGHT: 63 IN | WEIGHT: 248 LBS | BODY MASS INDEX: 43.94 KG/M2

## 2025-05-13 DIAGNOSIS — E61.1 IRON DEFICIENCY: Primary | ICD-10-CM

## 2025-05-13 DIAGNOSIS — K51.30 ULCERATIVE RECTOSIGMOIDITIS WITHOUT COMPLICATION (MULTI): ICD-10-CM

## 2025-05-13 DIAGNOSIS — K21.9 GASTROESOPHAGEAL REFLUX DISEASE WITHOUT ESOPHAGITIS: ICD-10-CM

## 2025-05-13 PROCEDURE — 99214 OFFICE O/P EST MOD 30 MIN: CPT | Performed by: INTERNAL MEDICINE

## 2025-05-13 PROCEDURE — 3008F BODY MASS INDEX DOCD: CPT | Performed by: INTERNAL MEDICINE

## 2025-05-13 RX ORDER — OMEPRAZOLE 40 MG/1
40 CAPSULE, DELAYED RELEASE ORAL
Qty: 180 CAPSULE | Refills: 3 | Status: SHIPPED | OUTPATIENT
Start: 2025-05-13 | End: 2026-05-13

## 2025-05-13 RX ORDER — FLUTICASONE PROPIONATE AND SALMETEROL 250; 50 UG/1; UG/1
POWDER RESPIRATORY (INHALATION)
COMMUNITY
Start: 2025-04-22

## 2025-05-13 NOTE — PROGRESS NOTES
Subjective     History of Present Illness:     50 yo with erosive gastropathy, ulcerative proctosigmoiditis diagnosed in 7/2023 here for follow up. Has been treated at [facility] by Dr. Cheung and NP Roma Paris.  She has been treated with prednisone, rowasa enemas and balsalazide, did not tolerate latter. [facility] in Feb 2024 at that time on rowasa enemas and 20mg prednisone to begin slow taper over 8 weeks. Plan with those providers was that if enemas do not maintain remission or intolerant then to discuss alternate therapy.  Has not tolerated oral or rectal mesalamine, leads to abd pain, diarrhea, vomiting at one point hospital admission.    Last visit plan for fecal calprotectin , Tspot prior to entyvio if active disease. Tspot returned positive, prevoiusly with indeterminate result. Repeat 8/19 also positive.  Fecal calprotectin returned 8/19/24 533.  Referred to ID and recommended to treat latent TB x 6 mo.   Started on entyvio 12/2024. Completed tx for TB in March, no issues with treatment or since.  Seems to be doing well. Bms formed, no blood , mucus.  Bms 1-2 x per day occasionally 3.  No abdominal pain.  Weight up 20 pounds due to perimenopause, has been seen by nutrition.   Gerd less controlled on omeprazole 40 mg daily, needs to go back up to 40 mg bid.      Previous history:  Dec 2022 rectal bleeding seen at [facility] told to have colonoscopy which was done in July 2023 listed below. Diagnosed with ulcerative rectosigmoiditis.  Started initially on enemas  x 2 weeks which led to severe diarrhea, took imodium which led to vomiting . Subsequently given balsalazide but ended up admitted 9/23 on balsalazide during that time because of vomiting , there for 1 week- given IV solumedol, 20 bms per day in hospital, liquid mucus , low abd pain, no significant bleeding at that time. Discharged on prednisone - slow taper over next 3-4 months.   Again tried suppository, enemas, balsalazide, and other 5-asa led to diarrhea ,  abd pain.  Last on medication in Feb- suppository which made her ill, abd cramping, vomiting, diarrhea.  Also stopped prednisone at that time.     CRP 2/5/24- <0.3  2/5/24- Hgb 11.2, WBC 8.6, nl plt  9/14/2023- iron 38, TIBC 144, % sat 24.6, ferritin 130.5    CT a/p 9.10.23- mild/mod acute vs inflammatory colitis; small hiatal hernia, mild hepatic steatosis, small nonobstructing b/l renal stones.    Colonoscopy 7/2023-normal TI, inflammation in rectum to sigmoid colon moderate. Diverticulosis in ascending and sigmoid.     Allergies  RX Allergies[1]    Medications  Current Outpatient Medications   Medication Instructions    albuterol (ProAir HFA) 90 mcg/actuation inhaler 2 puffs, inhalation, Every 4 hours PRN    albuterol 90 mcg/actuation inhaler 2 puffs, 4 times daily PRN    gabapentin (Neurontin) 300 mg capsule Take 1 capsule (300 mg) by mouth 3 times a day.    levothyroxine (SYNTHROID, LEVOXYL) 175 mcg, Daily RT    metoprolol succinate XL (TOPROL-XL) 100 mg, Daily RT    norethindrone (Micronor) 0.35 mg tablet 1 tablet, Daily    omeprazole (PRILOSEC) 40 mg, oral, Daily before breakfast    onabotulinumtoxinA (BOTOX) 200 Units    rizatriptan MLT (Maxalt-MLT) 10 mg disintegrating tablet PLEASE SEE ATTACHED FOR DETAILED DIRECTIONS    traZODone (Desyrel) 100 mg tablet 1 tablet, Nightly    venlafaxine XR (EFFEXOR-XR) 150 mg, Daily        Objective   There were no vitals taken for this visit.   Physical Exam  Cardiovascular:      Rate and Rhythm: Normal rate and regular rhythm.   Pulmonary:      Effort: Pulmonary effort is normal. No respiratory distress.      Breath sounds: Normal breath sounds. No wheezing.   Abdominal:      General: Bowel sounds are normal. There is no distension.      Palpations: Abdomen is soft. There is no mass.      Tenderness: There is no abdominal tenderness.   Neurological:      Mental Status: She is alert.               Assessment/Plan:    50 yo with erosive gastropathy, LTBI s/p 6 mo INH/B6,  ulcerative proctosigmoiditis diagnosed in 7/2023 here for follow up. Started entyvio in 12/2024 doing well, without lower GI complaints.  Weight up 20 pounds and reporting increase in reflux likely 2/2 wt gain. Will increase omeprazole to 40 mg bid. Will check iron studies and fecal calprotectin, plan repeat colonoscopy at end of year.     Rec  Cont entyvio q 8 weeks  Check iron studies, fecal wesly, cbc, cmp  Incr omeprazole to 40 mg bid  Follow up in 4 mo  Colonoscopy end of year/beginning of 2026.      Christiane Ramirez MD              [1]   Allergies  Allergen Reactions    [place] Unknown     Severe abd pain, diarrhea    Aspartame Unknown     migraine    Balsalazide GI Upset and Nausea/vomiting    Egg GI Upset     diarrhea    Erythromycin GI Upset    Sucralose Unknown     migraine

## 2025-05-14 LAB
ALBUMIN SERPL-MCNC: 4 G/DL (ref 3.6–5.1)
ALP SERPL-CCNC: 89 U/L (ref 31–125)
ALT SERPL-CCNC: 15 U/L (ref 6–29)
ANION GAP SERPL CALCULATED.4IONS-SCNC: 9 MMOL/L (CALC) (ref 7–17)
AST SERPL-CCNC: 18 U/L (ref 10–35)
BILIRUB SERPL-MCNC: 0.3 MG/DL (ref 0.2–1.2)
BUN SERPL-MCNC: 11 MG/DL (ref 7–25)
CALCIUM SERPL-MCNC: 8.9 MG/DL (ref 8.6–10.2)
CHLORIDE SERPL-SCNC: 98 MMOL/L (ref 98–110)
CO2 SERPL-SCNC: 28 MMOL/L (ref 20–32)
CREAT SERPL-MCNC: 0.77 MG/DL (ref 0.5–0.99)
EGFRCR SERPLBLD CKD-EPI 2021: 95 ML/MIN/1.73M2
ERYTHROCYTE [DISTWIDTH] IN BLOOD BY AUTOMATED COUNT: 14.4 % (ref 11–15)
FERRITIN SERPL-MCNC: 7 NG/ML (ref 16–232)
GLUCOSE SERPL-MCNC: 124 MG/DL (ref 65–139)
HCT VFR BLD AUTO: 38.7 % (ref 35–45)
HGB BLD-MCNC: 12.4 G/DL (ref 11.7–15.5)
IRON SATN MFR SERPL: 11 % (CALC) (ref 16–45)
IRON SERPL-MCNC: 45 MCG/DL (ref 40–190)
MCH RBC QN AUTO: 28.3 PG (ref 27–33)
MCHC RBC AUTO-ENTMCNC: 32 G/DL (ref 32–36)
MCV RBC AUTO: 88.4 FL (ref 80–100)
PLATELET # BLD AUTO: 305 THOUSAND/UL (ref 140–400)
PMV BLD REES-ECKER: 9.4 FL (ref 7.5–12.5)
POTASSIUM SERPL-SCNC: 4.2 MMOL/L (ref 3.5–5.3)
PROT SERPL-MCNC: 6.8 G/DL (ref 6.1–8.1)
RBC # BLD AUTO: 4.38 MILLION/UL (ref 3.8–5.1)
SODIUM SERPL-SCNC: 135 MMOL/L (ref 135–146)
TIBC SERPL-MCNC: 395 MCG/DL (CALC) (ref 250–450)
WBC # BLD AUTO: 7 THOUSAND/UL (ref 3.8–10.8)

## 2025-05-30 LAB — CALPROTECTIN STL-MCNT: 197 MCG/G

## 2025-08-04 DIAGNOSIS — K51.30 ULCERATIVE RECTOSIGMOIDITIS WITHOUT COMPLICATION (MULTI): Primary | ICD-10-CM

## 2025-08-07 DIAGNOSIS — K21.9 GASTROESOPHAGEAL REFLUX DISEASE WITHOUT ESOPHAGITIS: ICD-10-CM

## 2025-08-07 RX ORDER — OMEPRAZOLE 40 MG/1
40 CAPSULE, DELAYED RELEASE ORAL
Qty: 90 CAPSULE | Refills: 2 | Status: SHIPPED | OUTPATIENT
Start: 2025-08-07

## 2025-08-15 DIAGNOSIS — R76.11 POSITIVE TB TEST: ICD-10-CM

## 2025-08-15 DIAGNOSIS — Z22.7 TB LUNG, LATENT: ICD-10-CM

## 2025-09-09 ENCOUNTER — APPOINTMENT (OUTPATIENT)
Dept: GASTROENTEROLOGY | Facility: CLINIC | Age: 50
End: 2025-09-09
Payer: COMMERCIAL